# Patient Record
Sex: MALE | Race: WHITE | Employment: FULL TIME | URBAN - METROPOLITAN AREA
[De-identification: names, ages, dates, MRNs, and addresses within clinical notes are randomized per-mention and may not be internally consistent; named-entity substitution may affect disease eponyms.]

---

## 2020-07-27 ENCOUNTER — ANCILLARY PROCEDURE (OUTPATIENT)
Dept: GENERAL RADIOLOGY | Facility: CLINIC | Age: 30
End: 2020-07-27
Attending: FAMILY MEDICINE
Payer: COMMERCIAL

## 2020-07-27 ENCOUNTER — OFFICE VISIT (OUTPATIENT)
Dept: URGENT CARE | Facility: URGENT CARE | Age: 30
End: 2020-07-27
Payer: COMMERCIAL

## 2020-07-27 VITALS
SYSTOLIC BLOOD PRESSURE: 118 MMHG | WEIGHT: 160 LBS | HEIGHT: 73 IN | RESPIRATION RATE: 14 BRPM | OXYGEN SATURATION: 98 % | DIASTOLIC BLOOD PRESSURE: 80 MMHG | BODY MASS INDEX: 21.2 KG/M2 | HEART RATE: 88 BPM | TEMPERATURE: 98.6 F

## 2020-07-27 DIAGNOSIS — J98.01 BRONCHOSPASM: ICD-10-CM

## 2020-07-27 DIAGNOSIS — R00.2 PALPITATIONS: Primary | ICD-10-CM

## 2020-07-27 PROCEDURE — 93000 ELECTROCARDIOGRAM COMPLETE: CPT | Performed by: FAMILY MEDICINE

## 2020-07-27 PROCEDURE — 99204 OFFICE O/P NEW MOD 45 MIN: CPT | Performed by: FAMILY MEDICINE

## 2020-07-27 PROCEDURE — 71046 X-RAY EXAM CHEST 2 VIEWS: CPT

## 2020-07-27 RX ORDER — SERTRALINE HYDROCHLORIDE 100 MG/1
100 TABLET, FILM COATED ORAL
COMMUNITY
Start: 2020-06-09 | End: 2020-09-07

## 2020-07-27 RX ORDER — ALBUTEROL SULFATE 90 UG/1
1-2 AEROSOL, METERED RESPIRATORY (INHALATION) EVERY 4 HOURS PRN
Qty: 1 INHALER | Refills: 0 | Status: SHIPPED | OUTPATIENT
Start: 2020-07-27

## 2020-07-27 ASSESSMENT — MIFFLIN-ST. JEOR: SCORE: 1739.64

## 2020-07-28 NOTE — PATIENT INSTRUCTIONS
Patient Education     Understanding Heart Palpitations    Heart palpitations are a symptom. It s the feeling you have when your heartbeat seems to be racing, pounding, skipping, or fluttering. Heart palpitations are most often felt in the chest. Sometimes, they may also be felt in the neck.  What causes heart palpitations?  In most cases, heart palpitations are caused by:    Stress or anxiety    Exercise    Pregnancy    Some medicines    Caffeine    Nicotine    Alcohol    Illegal drugs, such as cocaine    Health problems, such as anemia or overactive thyroid  In some cases, heart palpitations may be caused by a problem with the heart. Abnormal heart rhythms (arrhythmias) are the main concern. They may need to be managed by you and your healthcare provider or treated right away.  How are heart palpitations treated?  Treatments for heart palpitations depend on the cause. Options may include:    Managing the things that trigger your heart palpitations. This could mean:  ? Learning ways to reduce stress and anxiety  ? Avoiding caffeine, nicotine, alcohol, or illegal drugs  ? Stopping the use of certain medicines, under your doctor s guidance    Medicines, procedures, or surgery to treat an arrhythmia or other health problem that is causing your symptoms  What are the complications of heart palpitations?  Complications of heart palpitations are rare unless they are caused by a problem such as an arrhythmia. In such cases, complications can include:    Fainting    Heart failure. This problem occurs when the heart is so weak it no longer pumps blood well.    Blood clots and stroke    Sudden cardiac arrest. This problem occurs when the heart suddenly stops beating.  When should I call my healthcare provider?  Call your healthcare provider right away if you have any of these:    Fever of 100.4 F (38 C) or higher, or as directed    Symptoms that don t get better with treatment, or symptoms that get worse    New symptoms,  such as chest pain, shortness of breath, dizziness, or fainting   Date Last Reviewed: 5/1/2016 2000-2019 [employer]. [address] [place] [address], [place] [address]. All rights reserved. This information is not intended as a substitute for professional medical care. Always follow your healthcare professional's instructions.

## 2020-07-28 NOTE — PROGRESS NOTES
SUBJECTIVE:   Chief Complaint   Patient presents with     Urgent Care     Breathing Problem     shortness of breath for years but flare up today.      Rocky Tuttle is a 29 year old male complains of rapid heart rate with  Shallow breathing  That occurred since 10  hours ago with a duration of  Constant for the 10 hours   There was  no chest pain, no  Paralysis, no paresthesias associated with this episode of rapid heartbeat,  Has felt shortness of breath and palpitations  Timing:  , gradual onset, still present and constant;    Context: onset during, light activity and at home, anxious.  Quality: light-headedness , mild-  He associates with taking beef heart and curcumin supplements  Use of stimulants - Coffee none,   Soda none,  Decongestants none,   Prescription medications  Takes sertraline for anxiety and depression,   Street drugs none  Says he has had worse symptoms with taking hydroxyzine  Anxiety or panic symptoms - yes anxiety with feeling his breathing is shallow  Denies cough, chest pain,  abdominal discomfort, nausea, vomiting, diarrhea, constipation, dysuria     Has had similar symptoms in the past, but no work-up-  Says  He had asthma when he was younger, but has not used an inhaler for years.  He is not sure if his symptoms are due to bronchospasm.  He is not aware of allergic exposure    PMH:  Anxiety    ALLERGIES:  Diazepam    MEDs  sertraline (ZOLOFT) 100 MG tablet, Take 100 mg by mouth    No current facility-administered medications on file prior to visit.       Social History     Tobacco Use     Smoking status: Never Smoker     Smokeless tobacco: Never Used   Substance Use Topics     Alcohol use: Not on file     Family History:  Non-contributory,  No associated family health conditions    Review Of Systems    Constitutional:  Negative for fevers, chills,  Feels some fatigue  Skin: negative for rash or lesions  Eyes: negative for eye pain, visual changes  Ears/Nose/Throat: negative for earache  " , sinus trouble,  sore throat  Respiratory: No shortness of breath, dyspnea on exertion   ,  He feels his breathing is shallow  Cardiovascular:  palpitations, tachycardia , without chest pain  Gastrointestinal: negative for vomiting, abdominal pain or diarrhea  Genitourinary: negative for dysuria or hematuria  Back: negative for pain with back movement,  CVA pain  Musculoskeletal: negative for generalized joint pain, joint swelling    Neurologic:   Generalized lightheadedness, without   local weakness or incoordination  Psychiatric:   feeling anxious , not feeling depressed  Hematologic/Lymphatic/Immunologic: negative for allergies or swollen nodes  Endocrine: negative for thyroid disorder or diabetes    OBJECTIVE:  /80   Pulse 88   Temp 98.6  F (37  C) (Oral)   Resp 14   Ht 1.854 m (6' 1\")   Wt 72.6 kg (160 lb)   SpO2 98%   BMI 21.11 kg/m    CONSTITUTIONAL:   Moderated distress ,  Appears healthy  EYES:  PERRLA, EOMI intact  ENT:  Ears  Normal ,  Mouth normal  Cranial nerves 2 through 12 grossly intact and No facial droop, no lid lag, normal facial features  NECK:  Supple. No adenopathy or masses in the neck or supraclavicular regions.    HEART: S1 and S2 normal, no murmurs, clicks, gallops or rubs. Regular rate and rhythm.  LUNGS:  Chest is clear; no wheezes or rales. No edema or JVD.   ABDOMEN:  Soft, non-tender, normal bowel sounds, no masses, no organomegaly   NEURO: . Mental status normal. Gait and station normal. Romberg negative. Cerebellar function is normal.  Walks on toes, heels and tandem walk without difficulty .  Finger- nose accurate.      Cranial nerves grossly intact.   MS:  Sensation equal and intact in all extremities    EKG:  .Normal sinus rhythm, rate 62,  No ischemic ST changes, no LVH     CXR:No cardiomegaly, no pneumothorax, no plural effusion,  no pulmonary edema,  No noted rib / clavicle fracture.     no infiltrate   x-ray read by me Debi Kelley MD      Assess/ " Plan    Palpitations     - EKG 12-lead complete w/read - Clinics  - XR Chest 2 Views  - albuterol (PROAIR HFA/PROVENTIL HFA/VENTOLIN HFA) 108 (90 Base) MCG/ACT inhaler; Inhale 1-2 puffs into the lungs every 4 hours as needed for shortness of breath / dyspnea or wheezing     The multiple causes of palpitations that may be perceived or associated with chest pain were considered, including myocardial infarction, arrhythmia, aortic dissection,  Pneumonia, pneumothorax, pleurisy,           Patient was reassured that today's testing and physical exam show no signs of severe pathology  EKG at present with no tachycardia or signs of cardiac pathology.  CXR normal  If worsening symptoms with with altered neurologic function, chest pain and/or shortness of breath go to ER  Follow-up with primary care to reassess symptoms     Suggested that he discuss with his primary care about possible ambulatory cardiac monitoring to assess if he has times of ectopic tachycardic rhythm     Bronchospasm     - albuterol (PROAIR HFA/PROVENTIL HFA/VENTOLIN HFA) 108 (90 Base) MCG/ACT inhaler; Inhale 1-2 puffs into the lungs every 4 hours as needed for shortness of breath / dyspnea or wheezing    Suggest that he do trial of albuterol inhaler when he feels symptoms of shallow breathing, like he can't get the air in.  Discussed the possibility of bronchospasm as a cause of his symptoms.  If he finds relief with the inhaler he should try to identify possible triggers of his bronchospasm symptoms